# Patient Record
Sex: FEMALE | Race: WHITE | NOT HISPANIC OR LATINO | URBAN - METROPOLITAN AREA
[De-identification: names, ages, dates, MRNs, and addresses within clinical notes are randomized per-mention and may not be internally consistent; named-entity substitution may affect disease eponyms.]

---

## 2022-11-16 ENCOUNTER — EMERGENCY (EMERGENCY)
Facility: HOSPITAL | Age: 37
LOS: 1 days | Discharge: HOME | End: 2022-11-16
Attending: EMERGENCY MEDICINE
Payer: COMMERCIAL

## 2022-11-16 VITALS
HEIGHT: 67 IN | SYSTOLIC BLOOD PRESSURE: 133 MMHG | WEIGHT: 169.98 LBS | DIASTOLIC BLOOD PRESSURE: 92 MMHG | TEMPERATURE: 99 F | HEART RATE: 84 BPM | OXYGEN SATURATION: 100 % | RESPIRATION RATE: 16 BRPM

## 2022-11-16 PROCEDURE — 99283 EMERGENCY DEPT VISIT LOW MDM: CPT

## 2022-11-16 NOTE — ED ADULT TRIAGE NOTE - CHIEF COMPLAINT QUOTE
right knee pain 2/2 to injury at [facility], unable to bear weight/ambulate

## 2022-11-16 NOTE — ED ADULT TRIAGE NOTE - IDEAL BODY WEIGHT(KG)
Dr. Janett Spain at bed side evaluating pt at this time. Will continue to monitor.       Amelia Colon RN  05/11/19 [phone]

## 2022-11-17 VITALS
OXYGEN SATURATION: 100 % | DIASTOLIC BLOOD PRESSURE: 82 MMHG | SYSTOLIC BLOOD PRESSURE: 123 MMHG | HEART RATE: 78 BPM | TEMPERATURE: 98 F | RESPIRATION RATE: 18 BRPM

## 2022-11-17 PROCEDURE — 73564 X-RAY EXAM KNEE 4 OR MORE: CPT

## 2022-11-17 PROCEDURE — 73564 X-RAY EXAM KNEE 4 OR MORE: CPT | Mod: 26,RT

## 2022-11-17 PROCEDURE — 99283 EMERGENCY DEPT VISIT LOW MDM: CPT | Mod: 25

## 2022-11-17 NOTE — ED PROVIDER NOTE - CLINICAL SUMMARY MEDICAL DECISION MAKING FREE TEXT BOX
37yoF with no PMH p/w R knee injury in [facility]. XR of knee showing no fracture. Pt not in any pain. Knee immobilized. Recommending sports medicine f/u

## 2022-11-17 NOTE — ED PROVIDER NOTE - PATIENT PORTAL LINK FT
You can access the FollowMyHealth Patient Portal offered by [facility] by registering at the following website: [email or web address]. By joining [employer]’s FollowMyHealth portal, you will also be able to view your health information using other applications (apps) compatible with our system.

## 2022-11-17 NOTE — ED PROVIDER NOTE - ATTENDING CONTRIBUTION TO CARE
Patient presents after injury to the knee during jujitsu class.  On exam, patient is unable to bear weight, has diffuse swelling of the knee, but no bony tenderness.  She is neurovascular intact.  X-ray shows no acute bony injury but does show a knee effusion.  Patient will be placed in a knee immobilizer, will be given crutches, and is safe for discharge home with supportive care, referral to sports medicine for further evaluation and possible MRI to assess for any underlying MSK injury.

## 2022-11-17 NOTE — ED PROVIDER NOTE - NSFOLLOWUPCLINICS_GEN_ALL_ED_FT
[facility]  Sports Medicine  [address] [place] [address]  Phone: [phone]  Fax:

## 2022-11-17 NOTE — ED PROVIDER NOTE - PHYSICAL EXAMINATION
GEN: Well Appearing, Nontoxic, NAD  HEENT: NC/AT, MMM  Neck: supple  CV: reg rate  RESP: normal WOB, no distress  ABD: non-distended  EXT/MSK:  diffuse swelling over R knee, no bony tenderness, no tibial plateau tenderness, slighlty limited ROM due to pain but able to range through most range  SKIN: well perfused  Neuro: AOX3, motor and sensory intact distal to injury

## 2022-11-17 NOTE — ED ADULT NURSE NOTE - OBJECTIVE STATEMENT
Pt is 36 y/o female, presenting to the ED c/o R knee pain. As per pt, was @ [employer] class when she hyperextended her knee. Pt reports that she heard a pop and has been unable to bear weight upon affected leg since. Pt denies pertinent PMH or daily medication use. As per pt, has not take any OTC pain meds and applied ice. Upon assessment, pt AxOx3, sitting up in stretcher and speaking in full sentences. Breathing spontaneously and unlabored, >95% RA. Abdomen soft and nontender to palpation. Skin is warm, dry, and intact w/ + peripheral pulses. Pt denies SOB, chest pain, n/v/d, dizziness, vision changes, chills, and fever. Safety and comfort measures provided- bed in lowest position, locked, and blanket given.

## 2022-11-17 NOTE — ED PROVIDER NOTE - NSFOLLOWUPINSTRUCTIONS_ED_ALL_ED_FT
YOU WERE SEEN IN THE ED FOR: right knee injury    FOR PAIN/FEVER, YOU MAY TAKE TYLENOL (acetaminophen) AND/OR IBUPROFEN (advil or motrin). FOLLOW THE INSTRUCTIONS ON THE LABEL/CONTAINER.    PLEASE FOLLOW UP WITH YOUR PRIVATE PHYSICIAN WITHIN THE NEXT 48 HOURS. BRING COPIES OF YOUR RESULTS.    PLEASE REST, ICE, COMPRESS/IMMOBILIZE, ELEVATE YOUR LEG.     RETURN TO THE EMERGENCY DEPARTMENT IF YOU EXPERIENCE ANY NEW/CONCERNING/WORSENING SYMPTOMS.

## 2022-11-17 NOTE — ED PROVIDER NOTE - SKIN NEGATIVE STATEMENT, MLM
no abrasions, no jaundice, no lesions, no pruritis, and no rashes.
No significant past surgical history

## 2022-11-17 NOTE — ED PROVIDER NOTE - OBJECTIVE STATEMENT
37yoF no pertinent PMH p/w pain in R knee. Pt was at [employer] class and instructor was showing a move on her when she hyperextended her knee, heard a pop and came straight to the ED. Used ice packs, has not taken any OTC pain meds. No pain in the knee. Pt was unable to bear weight on the knee and was carried into the ED.

## 2022-11-18 PROBLEM — Z78.9 OTHER SPECIFIED HEALTH STATUS: Chronic | Status: ACTIVE | Noted: 2022-11-17

## 2022-11-21 PROBLEM — Z00.00 ENCOUNTER FOR PREVENTIVE HEALTH EXAMINATION: Status: ACTIVE | Noted: 2022-11-21

## 2022-11-22 ENCOUNTER — APPOINTMENT (OUTPATIENT)
Dept: SPORTS MEDICINE | Facility: CLINIC | Age: 37
End: 2022-11-22

## 2022-11-22 VITALS — WEIGHT: 158.73 LBS | HEIGHT: 67.32 IN | BODY MASS INDEX: 24.62 KG/M2

## 2022-11-22 DIAGNOSIS — S82.141A DISPLACED BICONDYLAR FRACTURE OF RIGHT TIBIA, INITIAL ENCOUNTER FOR CLOSED FRACTURE: ICD-10-CM

## 2022-11-22 PROCEDURE — 99204 OFFICE O/P NEW MOD 45 MIN: CPT

## 2022-11-23 NOTE — HISTORY OF PRESENT ILLNESS
[de-identified] : 37-year-old female no past medical history presents with right knee pain and swelling x6 days.  Patient reports that she was in jujitsu class when her instructor was demonstrating a move on her by wrapping his leg underneath her R leg and then using his weight to bring the patient down to the ground.  Patient reports that her knee bent over his leg and she heard an immediate crack in her knee.  She was then unable to bear any weight on the right knee.  She went to [facility] emergency department that day and was reportedly found to have a negative x-ray and placed in a knee immobilizer, provided with crutches, and referred to the sports office.  Upon chart review the x-ray of her right knee was later reported as acute minimally displaced avulsion fracture of the tibial plateau.  Patient states that she has continued to have pain but the swelling has improved.  She is still unable to bear any weight on the right leg.  Had some bruising to her right lower leg and some pain to her calf.  Denies any chest pain, shortness of breath, history of DVT/PE.

## 2022-11-23 NOTE — DISCUSSION/SUMMARY
[de-identified] : Attending Attestation:\par \par Seen with Dr. Jeronimo.\par I saw and evaluated the patient and was involved with and agree with the fellow's history, physical exam, and I personally documented the assessment and plan of care.\par \par ASSESSMENT: \par \par Acute onset of right knee pain associated with swelling and instability while patient was at [facility] on November 16, 2022, when her instructor was demonstrating scissor takedown.  Patient was seen in the ED, discharged with crutches, in knee immobilizer.  X-ray showed tibial intercondylar eminence avulsion fracture.  Patient would benefit from referral for positive operative intervention.  Placed patient in a [place] hinged knee brace locked at 10 degrees and 90 degrees.  We will continue nonweightbearing with crutches until follow-up with orthopedist.  Will obtain MRI to evaluate for fracture pattern, associated soft tissue injury including ACL and meniscus as well as for surgical planning. Patient's HPI, exam, and imaging was discussed with Orthopedic Surgeon Dr. Cordero, who agreed with plan of care. \par \par Will treat symptoms with conservative management with activity modification, analgesic medications, HEP/PT, and re-evaluate the patient to see if they would benefit from further diagnostic testing, or referral for injection therapy or surgical intervention.\par \par Clinical and radiographic findings discussed. Diagnosis, prognosis and treatment options reviewed. Patient verbalizes understanding and all questions answered.\par \par Patient would like to consider sx intervention. \par \par PLAN:\par Additional Testing: MRI\par Further Intervention: Patient would like to consider surgical intervention\par Weight-bearing Status: NWB\par Assistive Devices: Crutches\par Therapy: HEP recommended and reviewed including range of motion exercises\par Health Management: BMI/Weight Management and physical activity level reviewed with the patient\par Home Modalities: RICE protocol for pain/swelling and home modalities reviewed with the patient\par Medications: OTC analgesics\par Orthotics: [place] hinged knee brace\par Work Status: Return to work as tolerated\par Activity Status: Avoid aggravating and high impact activities\par Sports/Gym Status: No gym or sports until cleared medically\par Follow-up: 4 weeks (or with Orthopedist)\par Referral: Orthopedic surgery, case d/w Dr. Cordero\par  \par Kailash Eckert MD

## 2022-11-23 NOTE — PHYSICAL EXAM
[de-identified] : General Appearance: Well nourished, well developed, in NAD\par Respiratory: NARD\par Skin: No lesions. \par Neuro: Awake and alert\par Mood/affect: Calm\par \par Right Knee Exam\par Inspection: Moderate joint effusion. No erythema, warmth, edema. Mild ecchymosis to right shin. No atrophy or hypertrophy is appreciated.\par Palpation: No lateral/medial joint line tenderness. +Mild TTP proximal tibia.  \par AROM: flexion extension 10-90 degrees. \par PROM: flexion extension  degrees. \par Strength: Quadriceps 4/5, hamstrings, TA, GCS strength 5/5\par Neurovascular: DP/TP pulse 2+. SILT\par \par Knee Special tests\par Lachman's - wnl\par Anterior drawer - wnl\par Posterior drawer - wnl\par Barry's - wnl\par Magnus test - wnl\par Varus and valgus stress at 0 and 30 - wnl\par Patellar grind - wnl

## 2022-11-26 ENCOUNTER — RESULT REVIEW (OUTPATIENT)
Age: 37
End: 2022-11-26

## 2022-11-26 ENCOUNTER — APPOINTMENT (OUTPATIENT)
Dept: MRI IMAGING | Facility: HOSPITAL | Age: 37
End: 2022-11-26

## 2022-11-26 ENCOUNTER — OUTPATIENT (OUTPATIENT)
Dept: OUTPATIENT SERVICES | Facility: HOSPITAL | Age: 37
LOS: 1 days | End: 2022-11-26
Payer: MEDICAID

## 2022-11-26 DIAGNOSIS — S82.141A DISPLACED BICONDYLAR FRACTURE OF RIGHT TIBIA, INITIAL ENCOUNTER FOR CLOSED FRACTURE: ICD-10-CM

## 2022-11-26 DIAGNOSIS — S83.511A SPRAIN OF ANTERIOR CRUCIATE LIGAMENT OF RIGHT KNEE, INITIAL ENCOUNTER: ICD-10-CM
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
## 2022-11-26 DIAGNOSIS — S82.111A DISPLACED FRACTURE OF RIGHT TIBIAL SPINE, INITIAL ENCOUNTER FOR CLOSED FRACTURE: ICD-10-CM

## 2022-11-26 DIAGNOSIS — S86.811A STRAIN OF OTHER MUSCLE(S) AND TENDON(S) AT LOWER LEG LEVEL, RIGHT LEG, INITIAL ENCOUNTER: ICD-10-CM

## 2022-11-26 PROCEDURE — 73721 MRI JNT OF LWR EXTRE W/O DYE: CPT

## 2022-11-26 PROCEDURE — 73721 MRI JNT OF LWR EXTRE W/O DYE: CPT | Mod: 26,RT

## 2023-07-05 ENCOUNTER — NON-APPOINTMENT (OUTPATIENT)
Age: 38
End: 2023-07-05

## 2023-07-11 ENCOUNTER — ASOB RESULT (OUTPATIENT)
Age: 38
End: 2023-07-11

## 2023-07-11 ENCOUNTER — APPOINTMENT (OUTPATIENT)
Dept: ANTEPARTUM | Facility: CLINIC | Age: 38
End: 2023-07-11
Payer: MEDICAID

## 2023-07-11 PROCEDURE — 76801 OB US < 14 WKS SINGLE FETUS: CPT

## 2023-07-24 ENCOUNTER — ASOB RESULT (OUTPATIENT)
Age: 38
End: 2023-07-24

## 2023-07-24 ENCOUNTER — APPOINTMENT (OUTPATIENT)
Dept: ANTEPARTUM | Facility: CLINIC | Age: 38
End: 2023-07-24
Payer: MEDICAID

## 2023-07-24 ENCOUNTER — TRANSCRIPTION ENCOUNTER (OUTPATIENT)
Age: 38
End: 2023-07-24

## 2023-07-24 PROCEDURE — 76815 OB US LIMITED FETUS(S): CPT | Mod: 59

## 2023-07-24 PROCEDURE — 76813 OB US NUCHAL MEAS 1 GEST: CPT

## 2023-07-31 NOTE — ED ADULT TRIAGE NOTE - MEANS OF ARRIVAL
Provider Procedure Text (A): After obtaining clear surgical margins the defect was repaired by another provider. wheelchair

## 2023-09-18 ENCOUNTER — APPOINTMENT (OUTPATIENT)
Dept: ANTEPARTUM | Facility: CLINIC | Age: 38
End: 2023-09-18
Payer: MEDICAID

## 2023-09-18 ENCOUNTER — ASOB RESULT (OUTPATIENT)
Age: 38
End: 2023-09-18

## 2023-09-18 PROCEDURE — 76811 OB US DETAILED SNGL FETUS: CPT

## 2023-09-18 PROCEDURE — 99204 OFFICE O/P NEW MOD 45 MIN: CPT | Mod: TH,25

## 2023-11-21 ENCOUNTER — APPOINTMENT (OUTPATIENT)
Dept: ANTEPARTUM | Facility: CLINIC | Age: 38
End: 2023-11-21
Payer: MEDICAID

## 2023-11-21 ENCOUNTER — ASOB RESULT (OUTPATIENT)
Age: 38
End: 2023-11-21

## 2023-11-21 PROCEDURE — 76816 OB US FOLLOW-UP PER FETUS: CPT

## 2023-11-21 PROCEDURE — 99213 OFFICE O/P EST LOW 20 MIN: CPT | Mod: TH,25

## 2023-12-18 ENCOUNTER — ASOB RESULT (OUTPATIENT)
Age: 38
End: 2023-12-18

## 2023-12-18 ENCOUNTER — APPOINTMENT (OUTPATIENT)
Dept: ANTEPARTUM | Facility: CLINIC | Age: 38
End: 2023-12-18
Payer: MEDICAID

## 2023-12-18 PROCEDURE — 76816 OB US FOLLOW-UP PER FETUS: CPT

## 2023-12-18 PROCEDURE — 76819 FETAL BIOPHYS PROFIL W/O NST: CPT | Mod: 59

## 2024-01-16 ENCOUNTER — APPOINTMENT (OUTPATIENT)
Dept: ANTEPARTUM | Facility: CLINIC | Age: 39
End: 2024-01-16
Payer: MEDICAID

## 2024-01-16 ENCOUNTER — ASOB RESULT (OUTPATIENT)
Age: 39
End: 2024-01-16

## 2024-01-16 PROCEDURE — 76816 OB US FOLLOW-UP PER FETUS: CPT

## 2024-01-16 PROCEDURE — 76819 FETAL BIOPHYS PROFIL W/O NST: CPT | Mod: 59

## 2024-01-17 ENCOUNTER — NON-APPOINTMENT (OUTPATIENT)
Age: 39
End: 2024-01-17

## 2024-01-19 ENCOUNTER — APPOINTMENT (OUTPATIENT)
Dept: PEDIATRIC UROLOGY | Facility: CLINIC | Age: 39
End: 2024-01-19
Payer: MEDICAID

## 2024-01-19 DIAGNOSIS — Q62.0 CONGENITAL HYDRONEPHROSIS: ICD-10-CM

## 2024-01-19 PROCEDURE — 99213 OFFICE O/P EST LOW 20 MIN: CPT | Mod: 95

## 2024-01-19 PROCEDURE — 99203 OFFICE O/P NEW LOW 30 MIN: CPT | Mod: 95

## 2024-01-19 PROCEDURE — 99204 OFFICE O/P NEW MOD 45 MIN: CPT | Mod: 95

## 2024-01-19 NOTE — HISTORY OF PRESENT ILLNESS
[TextBox_4] : I verified the identity of the patient and the reason for the appointment with the parent. I explained to the parent that telemedicine encounters are not the same as a direct patient/healthcare provider visit because the patient and healthcare provider are not in the same room, which can result in limitations, including with the physical examination. I explained that the telemedicine encounter may require the patients genitalia to be shown. I explained that after the telemedicine encounter, the patient may require an office visit for an in-person physical examination, ultrasound, or other testing. I informed the parent that there may be privacy risks associated with the use of the technology and that there may be costs associated with the encounter. I offered the option of an office visit rather than a telemedicine encounter. Parent stated that all explanations were understood, and that all questions were answered to their satisfaction. The parent verbalized their preference and consent to proceed with the telemedicine encounter.  SONNY is here for an initial consultation.  She is 39 weeks pregnant with her FIRST pregnancy conceived naturally.  Bilateral hydronephrosis was detected in utero at 20 weeks and has remained stable thus far. Most recent 10 mm right and 9 mm left. The bladder has been noted to be empty on ultrasound and the amniotic fluid levels are normal. No other anomalies have been detected.

## 2024-01-19 NOTE — ASSESSMENT
[FreeTextEntry1] :  The male fetus has bilateral sided hydronephrosis.  I discussed the anatomy using diagrams as well as the possible causes that will be determined after birth and the possible testing.  I recommended maintaining the pregnancy and avoiding early delivery given normal levels of amniotic fluid.  The  care will include: 1. prophylactic Amoxil at 15 mg/kg starting in the hospital 2. no ultrasound is needed after birth at the hospital 3. 1st renal-bladder sonogram will take place at the office visit at about 2 weeks of age. The imaging studies can be performed earlier if necessary. After better defining the anomaly, we can then proceed with any additional testing or procedures. I did outline these in general terms.      All questions were answered. We will reconvene prior to the delivery or at the 2 week visit after the baby is born.

## 2024-01-19 NOTE — REASON FOR VISIT
[Home] : at home, [unfilled] , at the time of the visit. [Medical Office: ([facility])___] : at the medical office located in  [Parents] : parents [Initial Consultation] : an initial consultation [TextBox_50] : prenatal consultation [TextBox_8] : Dr. Trini Cisse

## 2024-01-27 ENCOUNTER — INPATIENT (INPATIENT)
Facility: HOSPITAL | Age: 39
LOS: 1 days | Discharge: HOME | End: 2024-01-29
Attending: OBSTETRICS & GYNECOLOGY | Admitting: OBSTETRICS & GYNECOLOGY
Payer: COMMERCIAL

## 2024-01-27 DIAGNOSIS — O26.899 OTHER SPECIFIED PREGNANCY RELATED CONDITIONS, UNSPECIFIED TRIMESTER: ICD-10-CM

## 2024-01-28 VITALS — HEART RATE: 92 BPM | OXYGEN SATURATION: 97 %

## 2024-01-28 DIAGNOSIS — Z34.80 ENCOUNTER FOR SUPERVISION OF OTHER NORMAL PREGNANCY, UNSPECIFIED TRIMESTER: ICD-10-CM

## 2024-01-28 LAB
ALBUMIN SERPL ELPH-MCNC: 3.4 G/DL — SIGNIFICANT CHANGE UP (ref 3.3–5)
ALP SERPL-CCNC: 128 U/L — HIGH (ref 40–120)
ALT FLD-CCNC: 13 U/L — SIGNIFICANT CHANGE UP (ref 10–45)
ANION GAP SERPL CALC-SCNC: 17 MMOL/L — SIGNIFICANT CHANGE UP (ref 5–17)
APTT BLD: 26.1 SEC — SIGNIFICANT CHANGE UP (ref 24.5–35.6)
AST SERPL-CCNC: 17 U/L — SIGNIFICANT CHANGE UP (ref 10–40)
BASOPHILS # BLD AUTO: 0 K/UL — SIGNIFICANT CHANGE UP (ref 0–0.2)
BASOPHILS NFR BLD AUTO: 0 % — SIGNIFICANT CHANGE UP (ref 0–2)
BILIRUB SERPL-MCNC: 0.2 MG/DL — SIGNIFICANT CHANGE UP (ref 0.2–1.2)
BLD GP AB SCN SERPL QL: NEGATIVE — SIGNIFICANT CHANGE UP
BUN SERPL-MCNC: 9 MG/DL — SIGNIFICANT CHANGE UP (ref 7–23)
CALCIUM SERPL-MCNC: 9.4 MG/DL — SIGNIFICANT CHANGE UP (ref 8.4–10.5)
CHLORIDE SERPL-SCNC: 106 MMOL/L — SIGNIFICANT CHANGE UP (ref 96–108)
CO2 SERPL-SCNC: 20 MMOL/L — LOW (ref 22–31)
CREAT SERPL-MCNC: 0.45 MG/DL — LOW (ref 0.5–1.3)
DACRYOCYTES BLD QL SMEAR: SLIGHT — SIGNIFICANT CHANGE UP
EGFR: 128 ML/MIN/1.73M2 — SIGNIFICANT CHANGE UP
EOSINOPHIL # BLD AUTO: 0.1 K/UL — SIGNIFICANT CHANGE UP (ref 0–0.5)
EOSINOPHIL NFR BLD AUTO: 0.9 % — SIGNIFICANT CHANGE UP (ref 0–6)
FIBRINOGEN PPP-MCNC: 491 MG/DL — HIGH (ref 200–445)
GLUCOSE SERPL-MCNC: 142 MG/DL — HIGH (ref 70–99)
HCT VFR BLD CALC: 35.8 % — SIGNIFICANT CHANGE UP (ref 34.5–45)
HGB BLD-MCNC: 11.9 G/DL — SIGNIFICANT CHANGE UP (ref 11.5–15.5)
INR BLD: 1.03 RATIO — SIGNIFICANT CHANGE UP (ref 0.85–1.18)
LDH SERPL L TO P-CCNC: 171 U/L — SIGNIFICANT CHANGE UP (ref 50–242)
LYMPHOCYTES # BLD AUTO: 2.3 K/UL — SIGNIFICANT CHANGE UP (ref 1–3.3)
LYMPHOCYTES # BLD AUTO: 20.4 % — SIGNIFICANT CHANGE UP (ref 13–44)
MANUAL SMEAR VERIFICATION: SIGNIFICANT CHANGE UP
MCHC RBC-ENTMCNC: 31.6 PG — SIGNIFICANT CHANGE UP (ref 27–34)
MCHC RBC-ENTMCNC: 33.2 GM/DL — SIGNIFICANT CHANGE UP (ref 32–36)
MCV RBC AUTO: 95 FL — SIGNIFICANT CHANGE UP (ref 80–100)
MONOCYTES # BLD AUTO: 0.39 K/UL — SIGNIFICANT CHANGE UP (ref 0–0.9)
MONOCYTES NFR BLD AUTO: 3.5 % — SIGNIFICANT CHANGE UP (ref 2–14)
NEUTROPHILS # BLD AUTO: 8.46 K/UL — HIGH (ref 1.8–7.4)
NEUTROPHILS NFR BLD AUTO: 75.2 % — SIGNIFICANT CHANGE UP (ref 43–77)
PLAT MORPH BLD: NORMAL — SIGNIFICANT CHANGE UP
PLATELET # BLD AUTO: 197 K/UL — SIGNIFICANT CHANGE UP (ref 150–400)
POLYCHROMASIA BLD QL SMEAR: SLIGHT — SIGNIFICANT CHANGE UP
POTASSIUM SERPL-MCNC: 4.7 MMOL/L — SIGNIFICANT CHANGE UP (ref 3.5–5.3)
POTASSIUM SERPL-SCNC: 4.7 MMOL/L — SIGNIFICANT CHANGE UP (ref 3.5–5.3)
PROT SERPL-MCNC: 6.1 G/DL — SIGNIFICANT CHANGE UP (ref 6–8.3)
PROTHROM AB SERPL-ACNC: 11.3 SEC — SIGNIFICANT CHANGE UP (ref 9.5–13)
RBC # BLD: 3.77 M/UL — LOW (ref 3.8–5.2)
RBC # FLD: 14.8 % — HIGH (ref 10.3–14.5)
RBC BLD AUTO: ABNORMAL
RH IG SCN BLD-IMP: POSITIVE — SIGNIFICANT CHANGE UP
RH IG SCN BLD-IMP: POSITIVE — SIGNIFICANT CHANGE UP
SODIUM SERPL-SCNC: 143 MMOL/L — SIGNIFICANT CHANGE UP (ref 135–145)
URATE SERPL-MCNC: 4.3 MG/DL — SIGNIFICANT CHANGE UP (ref 2.5–7)
WBC # BLD: 11.25 K/UL — HIGH (ref 3.8–10.5)
WBC # FLD AUTO: 11.25 K/UL — HIGH (ref 3.8–10.5)

## 2024-01-28 RX ORDER — BENZOCAINE 10 %
1 GEL (GRAM) MUCOUS MEMBRANE EVERY 6 HOURS
Refills: 0 | Status: DISCONTINUED | OUTPATIENT
Start: 2024-01-28 | End: 2024-01-29

## 2024-01-28 RX ORDER — MAGNESIUM HYDROXIDE 400 MG/1
30 TABLET, CHEWABLE ORAL
Refills: 0 | Status: DISCONTINUED | OUTPATIENT
Start: 2024-01-28 | End: 2024-01-29

## 2024-01-28 RX ORDER — DIPHENOXYLATE HCL/ATROPINE 2.5-.025MG
2 TABLET ORAL ONCE
Refills: 0 | Status: DISCONTINUED | OUTPATIENT
Start: 2024-01-28 | End: 2024-01-29

## 2024-01-28 RX ORDER — CARBOPROST TROMETHAMINE 250 UG/ML
250 INJECTION, SOLUTION INTRAMUSCULAR ONCE
Refills: 0 | Status: DISCONTINUED | OUTPATIENT
Start: 2024-01-28 | End: 2024-01-29

## 2024-01-28 RX ORDER — OXYTOCIN 10 UNIT/ML
41.67 VIAL (ML) INJECTION
Qty: 20 | Refills: 0 | Status: COMPLETED | OUTPATIENT
Start: 2024-01-28 | End: 2024-01-28

## 2024-01-28 RX ORDER — OXYCODONE HYDROCHLORIDE 5 MG/1
5 TABLET ORAL
Refills: 0 | Status: DISCONTINUED | OUTPATIENT
Start: 2024-01-28 | End: 2024-01-29

## 2024-01-28 RX ORDER — PRAMOXINE HYDROCHLORIDE 150 MG/15G
1 AEROSOL, FOAM RECTAL EVERY 4 HOURS
Refills: 0 | Status: DISCONTINUED | OUTPATIENT
Start: 2024-01-28 | End: 2024-01-29

## 2024-01-28 RX ORDER — ACETAMINOPHEN 500 MG
975 TABLET ORAL
Refills: 0 | Status: DISCONTINUED | OUTPATIENT
Start: 2024-01-28 | End: 2024-01-29

## 2024-01-28 RX ORDER — SODIUM CHLORIDE 9 MG/ML
1000 INJECTION, SOLUTION INTRAVENOUS
Refills: 0 | Status: DISCONTINUED | OUTPATIENT
Start: 2024-01-28 | End: 2024-01-28

## 2024-01-28 RX ORDER — INFLUENZA VIRUS VACCINE 15; 15; 15; 15 UG/.5ML; UG/.5ML; UG/.5ML; UG/.5ML
0.5 SUSPENSION INTRAMUSCULAR ONCE
Refills: 0 | Status: DISCONTINUED | OUTPATIENT
Start: 2024-01-28 | End: 2024-01-29

## 2024-01-28 RX ORDER — CITRIC ACID/SODIUM CITRATE 300-500 MG
15 SOLUTION, ORAL ORAL EVERY 6 HOURS
Refills: 0 | Status: DISCONTINUED | OUTPATIENT
Start: 2024-01-28 | End: 2024-01-28

## 2024-01-28 RX ORDER — OXYCODONE HYDROCHLORIDE 5 MG/1
5 TABLET ORAL ONCE
Refills: 0 | Status: DISCONTINUED | OUTPATIENT
Start: 2024-01-28 | End: 2024-01-29

## 2024-01-28 RX ORDER — CHLORHEXIDINE GLUCONATE 213 G/1000ML
1 SOLUTION TOPICAL DAILY
Refills: 0 | Status: DISCONTINUED | OUTPATIENT
Start: 2024-01-28 | End: 2024-01-28

## 2024-01-28 RX ORDER — DIBUCAINE 1 %
1 OINTMENT (GRAM) RECTAL EVERY 6 HOURS
Refills: 0 | Status: DISCONTINUED | OUTPATIENT
Start: 2024-01-28 | End: 2024-01-29

## 2024-01-28 RX ORDER — IBUPROFEN 200 MG
600 TABLET ORAL EVERY 6 HOURS
Refills: 0 | Status: DISCONTINUED | OUTPATIENT
Start: 2024-01-28 | End: 2024-01-29

## 2024-01-28 RX ORDER — TETANUS TOXOID, REDUCED DIPHTHERIA TOXOID AND ACELLULAR PERTUSSIS VACCINE, ADSORBED 5; 2.5; 8; 8; 2.5 [IU]/.5ML; [IU]/.5ML; UG/.5ML; UG/.5ML; UG/.5ML
0.5 SUSPENSION INTRAMUSCULAR ONCE
Refills: 0 | Status: DISCONTINUED | OUTPATIENT
Start: 2024-01-28 | End: 2024-01-29

## 2024-01-28 RX ORDER — DIPHENHYDRAMINE HCL 50 MG
25 CAPSULE ORAL EVERY 6 HOURS
Refills: 0 | Status: DISCONTINUED | OUTPATIENT
Start: 2024-01-28 | End: 2024-01-29

## 2024-01-28 RX ORDER — HYDROCORTISONE 1 %
1 OINTMENT (GRAM) TOPICAL EVERY 6 HOURS
Refills: 0 | Status: DISCONTINUED | OUTPATIENT
Start: 2024-01-28 | End: 2024-01-29

## 2024-01-28 RX ORDER — LANOLIN
1 OINTMENT (GRAM) TOPICAL EVERY 6 HOURS
Refills: 0 | Status: DISCONTINUED | OUTPATIENT
Start: 2024-01-28 | End: 2024-01-29

## 2024-01-28 RX ORDER — OXYTOCIN 10 UNIT/ML
333.33 VIAL (ML) INJECTION
Qty: 20 | Refills: 0 | Status: DISCONTINUED | OUTPATIENT
Start: 2024-01-28 | End: 2024-01-28

## 2024-01-28 RX ORDER — IBUPROFEN 200 MG
600 TABLET ORAL EVERY 6 HOURS
Refills: 0 | Status: COMPLETED | OUTPATIENT
Start: 2024-01-28 | End: 2024-12-26

## 2024-01-28 RX ORDER — AER TRAVELER 0.5 G/1
1 SOLUTION RECTAL; TOPICAL EVERY 4 HOURS
Refills: 0 | Status: DISCONTINUED | OUTPATIENT
Start: 2024-01-28 | End: 2024-01-29

## 2024-01-28 RX ORDER — KETOROLAC TROMETHAMINE 30 MG/ML
30 SYRINGE (ML) INJECTION ONCE
Refills: 0 | Status: DISCONTINUED | OUTPATIENT
Start: 2024-01-28 | End: 2024-01-28

## 2024-01-28 RX ORDER — SODIUM CHLORIDE 9 MG/ML
3 INJECTION INTRAMUSCULAR; INTRAVENOUS; SUBCUTANEOUS EVERY 8 HOURS
Refills: 0 | Status: DISCONTINUED | OUTPATIENT
Start: 2024-01-28 | End: 2024-01-29

## 2024-01-28 RX ORDER — SIMETHICONE 80 MG/1
80 TABLET, CHEWABLE ORAL EVERY 4 HOURS
Refills: 0 | Status: DISCONTINUED | OUTPATIENT
Start: 2024-01-28 | End: 2024-01-29

## 2024-01-28 RX ADMIN — Medication 125 MILLIUNIT(S)/MIN: at 07:43

## 2024-01-28 RX ADMIN — Medication 975 MILLIGRAM(S): at 09:03

## 2024-01-28 RX ADMIN — Medication 975 MILLIGRAM(S): at 14:50

## 2024-01-28 RX ADMIN — Medication 30 MILLIGRAM(S): at 11:58

## 2024-01-28 RX ADMIN — Medication 600 MILLIGRAM(S): at 18:01

## 2024-01-28 RX ADMIN — Medication 975 MILLIGRAM(S): at 09:35

## 2024-01-28 RX ADMIN — Medication 30 MILLIGRAM(S): at 12:32

## 2024-01-28 RX ADMIN — Medication 600 MILLIGRAM(S): at 18:30

## 2024-01-28 RX ADMIN — Medication 1 TABLET(S): at 18:01

## 2024-01-28 RX ADMIN — Medication 975 MILLIGRAM(S): at 15:20

## 2024-01-28 NOTE — OB PROVIDER TRIAGE NOTE - NSOBPROVIDERNOTE_OBGYN_ALL_OB_FT
Assessment  39yo  @38+6 wga presenting with ctx, ROL. Also with labile BPs  Renal pyelectasis b/l - evaluated by pediatric urology and recommended prophylactic amox and sono 2 weeks post natally  LGA. EFW 90th%tile, EFW 98%tile  GBS unk    Plan:  -Will reassess for cervical change, continue to observe  -Fetus: cat 1 tracing. VTX. EFW 3800g by sono. Continuous EFM. Sono  -Labile BPs - will send HELLP labs    Patient discussed with attending physician, Dr. Zen Johns MD PGY1 Assessment  39yo  @38+6 wga presenting with ctx, ROL. Also with labile BPs  Renal pyelectasis b/l - evaluated by pediatric urology and recommended prophylactic amox and sono 2 weeks post natally  LGA. EFW 90th%tile, AC 98%tile  GBS unk    Plan:  -Will reassess for cervical change, continue to observe  -Fetus: cat 1 tracing. VTX. EFW 3800g by sono. Continuous EFM. Sono  -Labile BPs - will send HELLP labs    Patient discussed with attending physician, Dr. Zen Johns MD PGY1

## 2024-01-28 NOTE — OB PROVIDER TRIAGE NOTE - HISTORY OF PRESENT ILLNESS
HPI: 38 yoF  @38+6wga presents for ROL. Reports ctx since 10:45p, initially 6/10 in pain, now 8/10 pain every 10 mins.   +FM. -LOF. -VB. Pt denies any other concerns.    – PNC: Renal pyelectasis b/l. LGA. GBS unk.  EFW 3800g by sono.  Renal pyelectasis b/l - evaluated by pediatric urology and recommended prophylactic amox and sono 2 weeks post natally  LGA. EFW 90th%tile, EFW 98%tile  – OBHx: denies  – GynHx: denies  – PMH: denies  – PSH: denies  – Psych: denies   – Social: denies   – Meds: PNV   – Allergies: NKDA, shellfish (hives)  – Will accept blood transfusions? Yes     HPI: 38 yoF  @38+6wga presents for ROL. Reports ctx since 10:45p, initially 6/10 in pain, now 8/10 pain every 10 mins.   +FM. -LOF. -VB. Pt denies any other concerns.    – PNC: Renal pyelectasis b/l. LGA. GBS unk.  EFW 3800g by sono.  Renal pyelectasis b/l - evaluated by pediatric urology and recommended prophylactic amox and sono 2 weeks post natally  LGA. EFW 90th%tile, AC 98%tile  – OBHx: denies  – GynHx: denies  – PMH: denies  – PSH: denies  – Psych: denies   – Social: denies   – Meds: PNV   – Allergies: NKDA, shellfish (hives)  – Will accept blood transfusions? Yes

## 2024-01-28 NOTE — OB PROVIDER DELIVERY SUMMARY - NSPROVIDERDELIVERYNOTE_OBGYN_ALL_OB_FT
over an intact perineum, after a relatively brief maternal pushing period.  no nuchal cord.  no problem w/ delivery of baby's shoulders.  cord gases and private cord blood / cord tissue collection done.  intact placenta delivered a few min after delivery.  placenta appeared grossly normal and was discarded  mild PPH managed w/ IV Pitocin bolus and hemabate injection  2nd degree perin lac and a superficial labial lac were noted and repaired.

## 2024-01-28 NOTE — OB RN DELIVERY SUMMARY - NSSELHIDDEN_OBGYN_ALL_OB_FT
[NS_DeliveryAttending1_OBGYN_ALL_OB_FT:[id number]],[NS_DeliveryRN_OBGYN_ALL_OB_FT:[id number]]

## 2024-01-28 NOTE — OB PROVIDER H&P - NSHPPHYSICALEXAM_GEN_ALL_CORE
Objective  – Vital Signs  BP: 130-140/80-90s  HR: 70-80s  Temp: AF    – PE:   CV: RRR  Pulm: breathing comfortably on RA  Abd: gravid, nontender  Extr: moving all extremities with ease    – VE: 4/70/-3  – FHT: baseline 135bpm, mod variability, +accels, -decels  – [place]: ctx q2-4mins  – EFW: 3800g by sono  – Sono: vertex

## 2024-01-28 NOTE — OB PROVIDER TRIAGE NOTE - NSHPPHYSICALEXAM_GEN_ALL_CORE
Objective  – Vital Signs  BP: 130-140/80-90s  HR: 70-80s  Temp: AF    – PE:   CV: RRR  Pulm: breathing comfortably on RA  Abd: gravid, nontender  Extr: moving all extremities with ease    – VE: 3/70/-3  – FHT: baseline 135bpm, mod variability, +accels, -decels  – [place]: ctx q2-4mins  – EFW: 3800g by sono  – Sono: vertex

## 2024-01-28 NOTE — OB RN TRIAGE NOTE - NSMATERNALFETALCONCERNS_OBGYN_ALL_OB_FT
Fetal Alert  24 - LGA.  Bilateral hydronephrosis, 9-10mm.  Notify pediatrician for  assessment. -Jessica Wang, RNYU  24 - Prenatal consult with Dr. Stanford Martinez recommends prophylactic amoxil.  First sono can be done in his office at about 2 weeks of age. -Jessica Wang, RNC

## 2024-01-28 NOTE — OB RN DELIVERY SUMMARY - NS_SEPSISRSKCALC_OBGYN_ALL_OB_FT
EOS calculated successfully. EOS Risk Factor: 0.5/1000 live births ([facility] national incidence); GA=38w6d; Temp=98.4; ROM=0.217; GBS='Negative'; Antibiotics='No antibiotics or any antibiotics < 2 hrs prior to birth'

## 2024-01-28 NOTE — OB NEONATOLOGY/PEDIATRICIAN DELIVERY SUMMARY - NSMATERNALFETALCONCERNS_OBGYN_ALL_OB_FT
Fetal Alert  24 - LGA.  Bilateral hydronephrosis, 9-10mm.  Notify pediatrician for  assessment. -Jessica Wang, RNYU  24 - Prenatal consult with Dr. Stanford Arnold recommends prophylactic amoxil.  First sono can be done in his office at about 2 weeks of age. -Jessica Wang, RNC

## 2024-01-28 NOTE — OB RN PATIENT PROFILE - FUNCTIONAL ASSESSMENT - BASIC MOBILITY 6.
4-calculated by average/Not able to assess (calculate score using [facility] averaging method)

## 2024-01-28 NOTE — OB RN PATIENT PROFILE - FALL HARM RISK - UNIVERSAL INTERVENTIONS
Bed in lowest position, wheels locked, appropriate side rails in place/Call bell, personal items and telephone in reach/Instruct patient to call for assistance before getting out of bed or chair/Non-slip footwear when patient is out of bed/[place] to call system/Physically safe environment - no spills, clutter or unnecessary equipment/Purposeful Proactive Rounding/Room/bathroom lighting operational, light cord in reach

## 2024-01-28 NOTE — OB PROVIDER TRIAGE NOTE - ABORTIONS, OB PROFILE
1. Have you been to the ER, urgent care clinic since your last visit? Hospitalized since your last visit? No    2. Have you seen or consulted any other health care providers outside of the [address] [place] [address] since your last visit? Include any pap smears or colon screening.  No 0

## 2024-01-28 NOTE — OB RN PATIENT PROFILE - EDUCATION OF THE PLACEMENT OF INFANT DURING SKIN TO SKIN: THE INFANT IS TO BE PLACED BELLY DOWN DIRECTLY ON PARENT'S CHEST, POSITIONED WITH INFANT'S FACE TOWARD PARENT'S FACE, SO THE PARENT CAN OBSERVE THE INFANT’S COLOR AT ALL TIMES.
What Is The Reason For Today's Visit?: Full Body Skin Examination with No Concerns
What Is The Reason For Today's Visit? (Being Monitored For X): the re-examination of lesions previously examined
How Severe Are Your Spot(S)?: mild
Statement Selected
What Type Of Note Output Would You Prefer (Optional)?: Standard Output

## 2024-01-28 NOTE — OB PROVIDER H&P - ATTENDING COMMENTS
ob attg note  agree w/ ob resident's note  pt in spont labor, admit for routine labor mgmt.  uncomplicated pregnancy.  b/l renal pyelectasis noted on ob sono.   EFW 8.5 lb.

## 2024-01-28 NOTE — OB PROVIDER H&P - HISTORY OF PRESENT ILLNESS
HPI: 38 yoF  @38+6wga presents for ROL. Reports ctx since 10:45p, initially 6/10 in pain, now 8/10 pain every 10 mins.   +FM. -LOF. -VB. Pt denies any other concerns.    – PNC: Renal pyelectasis b/l. LGA. GBS unk.  EFW 3800g by sono.  Renal pyelectasis b/l - evaluated by pediatric urology and recommended prophylactic amox and sono 2 weeks post natally  LGA. EFW 90th%tile, AC 98%tile  – OBHx: denies  – GynHx: denies  – PMH: denies  – PSH: denies  – Psych: denies   – Social: denies   – Meds: PNV   – Allergies: NKDA, shellfish (hives)  – Will accept blood transfusions? Yes

## 2024-01-28 NOTE — OB PROVIDER LABOR PROGRESS NOTE - ASSESSMENT
pt with cervical change, in labor, will admit    PLAN:  Continuous FHT/[place]  Maternal/fetal status reassuring  Will continue to reassess prn.    D/w Dr. Zen Johns PGY1

## 2024-01-28 NOTE — OB PROVIDER H&P - NSLOWPPHRISK_OBGYN_A_OB
No previous uterine incision/[place] Pregnancy/Less than or equal to 4 previous vaginal births/No known bleeding disorder/No history of postpartum hemorrhage/No other PPH risks indicated

## 2024-01-28 NOTE — OB NEONATOLOGY/PEDIATRICIAN DELIVERY SUMMARY - NSPEDSNEONOTESA_OBGYN_ALL_OB_FT
Peds NP requested to attend delivery due to heavy meconium fluids for this 38.6wk male born on 24 at 0320 via precipitous  to a 39 y/o  blood type B+ mother.  Maternal history of cyst removal.  Prenatal history of fetal alert for Bilateral hydronephrosis, 9-10mm.  PNL HIV -/Hep B-/RPR non-reactive/Rubella immune, GBS - on 24.  AROM at 0307 with heavy meconium fluids.  Baby emerged vigorous, crying, was warmed/ dried/ suctioned/ stimulated with APGARS of 9/9.  Baby noted with low O2 of 86%room air with emerging tachypnea at 9MOL, CPAP started with max FiO2 30%/+5 able to wean to room air by 18MOL.  Mom plans to initiate breastfeeding, declines Hep B vaccine, and declines circ.  Highest maternal temp 36.9C with EOS of 0.04.  Admitted under Dr. Rutherford.

## 2024-01-29 ENCOUNTER — TRANSCRIPTION ENCOUNTER (OUTPATIENT)
Age: 39
End: 2024-01-29

## 2024-01-29 VITALS
OXYGEN SATURATION: 96 % | HEART RATE: 97 BPM | RESPIRATION RATE: 18 BRPM | TEMPERATURE: 98 F | DIASTOLIC BLOOD PRESSURE: 84 MMHG | SYSTOLIC BLOOD PRESSURE: 134 MMHG

## 2024-01-29 LAB — T PALLIDUM AB TITR SER: NEGATIVE — SIGNIFICANT CHANGE UP

## 2024-01-29 PROCEDURE — 85025 COMPLETE CBC W/AUTO DIFF WBC: CPT

## 2024-01-29 PROCEDURE — 85610 PROTHROMBIN TIME: CPT

## 2024-01-29 PROCEDURE — 80053 COMPREHEN METABOLIC PANEL: CPT

## 2024-01-29 PROCEDURE — 85730 THROMBOPLASTIN TIME PARTIAL: CPT

## 2024-01-29 PROCEDURE — 86901 BLOOD TYPING SEROLOGIC RH(D): CPT

## 2024-01-29 PROCEDURE — 85384 FIBRINOGEN ACTIVITY: CPT

## 2024-01-29 PROCEDURE — 86900 BLOOD TYPING SEROLOGIC ABO: CPT

## 2024-01-29 PROCEDURE — 86780 TREPONEMA PALLIDUM: CPT

## 2024-01-29 PROCEDURE — 36415 COLL VENOUS BLD VENIPUNCTURE: CPT

## 2024-01-29 PROCEDURE — 84550 ASSAY OF BLOOD/URIC ACID: CPT

## 2024-01-29 PROCEDURE — 86850 RBC ANTIBODY SCREEN: CPT

## 2024-01-29 PROCEDURE — 59050 FETAL MONITOR W/REPORT: CPT

## 2024-01-29 PROCEDURE — 83615 LACTATE (LD) (LDH) ENZYME: CPT

## 2024-01-29 RX ORDER — FERROUS SULFATE 325(65) MG
1 TABLET ORAL
Qty: 0 | Refills: 0 | DISCHARGE

## 2024-01-29 RX ORDER — LANOLIN
1 OINTMENT (GRAM) TOPICAL
Qty: 0 | Refills: 0 | DISCHARGE
Start: 2024-01-29

## 2024-01-29 RX ORDER — SIMETHICONE 80 MG/1
1 TABLET, CHEWABLE ORAL
Qty: 0 | Refills: 0 | DISCHARGE
Start: 2024-01-29

## 2024-01-29 RX ORDER — IBUPROFEN 200 MG
3 TABLET ORAL
Qty: 0 | Refills: 0 | DISCHARGE

## 2024-01-29 RX ORDER — ACETAMINOPHEN 500 MG
2 TABLET ORAL
Qty: 0 | Refills: 0 | DISCHARGE

## 2024-01-29 RX ORDER — DOCUSATE SODIUM 100 MG
1 CAPSULE ORAL
Qty: 0 | Refills: 0 | DISCHARGE

## 2024-01-29 RX ADMIN — Medication 975 MILLIGRAM(S): at 08:28

## 2024-01-29 RX ADMIN — Medication 1 TABLET(S): at 12:45

## 2024-01-29 RX ADMIN — Medication 600 MILLIGRAM(S): at 04:58

## 2024-01-29 RX ADMIN — Medication 600 MILLIGRAM(S): at 12:45

## 2024-01-29 RX ADMIN — Medication 975 MILLIGRAM(S): at 14:58

## 2024-01-29 RX ADMIN — Medication 975 MILLIGRAM(S): at 09:00

## 2024-01-29 RX ADMIN — Medication 600 MILLIGRAM(S): at 05:28

## 2024-01-29 NOTE — PROGRESS NOTE ADULT - ASSESSMENT
A/P: 39yo PPD#1 s/p .  Patient is stable and doing well post-partum.   - Pain well controlled, continue current pain regimen  - Increase ambulation, SCDs when not ambulating  - Continue regular diet    Leigh Branch PGY1

## 2024-01-29 NOTE — DISCHARGE NOTE OB - TEMPERATURE GREATER THAN 100.0  F ORALLY
Aspirating on mechanical soft diet as well as honey thickened liquids and pudding, patient is up in chair with legs down and back up to a 90 degree angle, 1:1 with meals and being spoon fed liquids as ordered. Notified Dr. Anand NPO pending speech eval on Monday.    Statement Selected

## 2024-01-29 NOTE — DISCHARGE NOTE OB - CARE PROVIDER_API CALL
Ernie Zaldivar  Obstetrics and Gynecology  [address] [facility] [address] [facility], [address]  [place] [address]  Phone: [phone]  Fax: [phone]  Follow Up Time:

## 2024-01-29 NOTE — PROGRESS NOTE ADULT - ATTENDING COMMENTS
Ob Attg Note:  Pt is doing well.  I agree w/ the daily note entered today, and the plan of care.  Pt would like to be discharged today.  she's cleared to be discharged.    follow up and home instructions discussed w/ pt and her partner. all q's answered.

## 2024-01-29 NOTE — DISCHARGE NOTE OB - MEDICATION SUMMARY - MEDICATIONS TO TAKE
I will START or STAY ON the medications listed below when I get home from the hospital:    ibuprofen 200 mg oral tablet  -- 3 tab(s) by mouth every 6 hours as needed for  moderate pain  -- Indication: For pain, cramps or fever    acetaminophen 500 mg oral tablet  -- 2 tab(s) by mouth every 6 hours as needed for  moderate pain  -- Indication: For pain, cramps or fever    lanolin topical ointment  -- 1 Apply on skin to affected area every 6 hours As needed nipple soreness  -- Indication: For cracked or sore nipples    Prenatal Multivitamins with Folic Acid 1 mg oral tablet  -- 1 tab(s) by mouth once a day  -- Indication: For vitamins    Slow Fe (as elemental iron) 45 mg oral tablet, extended release  -- 1 tab(s) by mouth once a day  -- Indication: For iron supplement    Colace 100 mg oral capsule  -- 1 cap(s) by mouth twice a day after breakfast and dinner as needed for  constipation  -- Indication: For constipation    simethicone 80 mg oral tablet, chewable  -- 1 tab(s) by mouth every 4 hours As needed Gas  -- Indication: For gas pain

## 2024-01-29 NOTE — DISCHARGE NOTE OB - PATIENT PORTAL LINK FT
You can access the FollowMyHealth Patient Portal offered by [facility] by registering at the following website: [email or web address]. By joining [employer]’s FollowMyHealth portal, you will also be able to view your health information using other applications (apps) compatible with our system.

## 2024-01-29 NOTE — PROGRESS NOTE ADULT - SUBJECTIVE AND OBJECTIVE BOX
OB Progress Note:  PPD#1    S: 39yo  PPD#1 s/p . Patient feels well. Pain is well controlled. She is tolerating a regular diet and passing flatus. She is voiding spontaneously, and ambulating without difficulty. Denies CP/SOB. Denies lightheadedness/dizziness. Denies N/V.    O:  Vitals:  Vital Signs Last 24 Hrs  T(C): 36.7 (2024 16:51), Max: 37 (2024 08:15)  T(F): 98.1 (2024 16:51), Max: 98.6 (2024 08:15)  HR: 83 (2024 16:51) (83 - 106)  BP: 112/76 (2024 16:51) (109/73 - 134/71)  BP(mean): 85 (2024 06:00) (85 - 96)  RR: 18 (2024 16:51) (18 - 18)  SpO2: 96% (2024 16:51) (90% - 99%)    Parameters below as of 2024 16:51  Patient On (Oxygen Delivery Method): room air        MEDICATIONS  (STANDING):  acetaminophen     Tablet .. 975 milliGRAM(s) Oral <User Schedule>  carboprost Injectable 250 MICROGram(s) IntraMuscular once  diphenoxylate/atropine 2 Tablet(s) Oral once  diphtheria/tetanus/pertussis (acellular) Vaccine (Adacel) 0.5 milliLiter(s) IntraMuscular once  ibuprofen  Tablet. 600 milliGRAM(s) Oral every 6 hours  influenza   Vaccine 0.5 milliLiter(s) IntraMuscular once  prenatal multivitamin 1 Tablet(s) Oral daily  sodium chloride 0.9% lock flush 3 milliLiter(s) IV Push every 8 hours      Labs:  Blood type: B Positive  Rubella IgG: RPR: Negative                          11.9   11.25<H> >-----------< 197    (  @ 01:58 )             35.8    24 @ 01:58      143  |  106  |  9   ----------------------------<  142<H>  4.7   |  20<L>  |  0.45<L>        Ca    9.4      2024 01:58    TPro  6.1  /  Alb  3.4  /  TBili  0.2  /  DBili  x   /  AST  17  /  ALT  13  /  AlkPhos  128<H>  24 @ 01:58          Physical Exam:  General: NAD  Abdomen: soft, non-tender, non-distended, fundus firm  Vaginal: Lochia wnl  Extremities: No erythema/edema

## 2024-01-29 NOTE — DISCHARGE NOTE OB - NS MD DC FALL RISK RISK
For information on Fall & Injury Prevention, visit: [email or web address] OR  [email or web address] OR  https://www.cdc.gov/steadi/patient.html

## 2024-02-01 ENCOUNTER — APPOINTMENT (OUTPATIENT)
Age: 39
End: 2024-02-01
Payer: MEDICAID

## 2024-02-01 PROCEDURE — S9445: CPT

## 2024-02-05 ENCOUNTER — APPOINTMENT (OUTPATIENT)
Age: 39
End: 2024-02-05
Payer: MEDICAID

## 2024-02-05 PROCEDURE — S9445: CPT

## 2024-02-09 ENCOUNTER — APPOINTMENT (OUTPATIENT)
Age: 39
End: 2024-02-09

## 2024-02-23 ENCOUNTER — APPOINTMENT (OUTPATIENT)
Age: 39
End: 2024-02-23